# Patient Record
Sex: FEMALE | Race: WHITE | NOT HISPANIC OR LATINO | Employment: UNEMPLOYED | URBAN - NONMETROPOLITAN AREA
[De-identification: names, ages, dates, MRNs, and addresses within clinical notes are randomized per-mention and may not be internally consistent; named-entity substitution may affect disease eponyms.]

---

## 2017-10-31 ENCOUNTER — OFFICE VISIT (OUTPATIENT)
Dept: FAMILY MEDICINE CLINIC | Facility: CLINIC | Age: 32
End: 2017-10-31

## 2017-10-31 VITALS
OXYGEN SATURATION: 99 % | HEIGHT: 67 IN | SYSTOLIC BLOOD PRESSURE: 115 MMHG | TEMPERATURE: 98.1 F | DIASTOLIC BLOOD PRESSURE: 75 MMHG | WEIGHT: 173 LBS | BODY MASS INDEX: 27.15 KG/M2 | HEART RATE: 84 BPM

## 2017-10-31 DIAGNOSIS — D50.8 IRON DEFICIENCY ANEMIA SECONDARY TO INADEQUATE DIETARY IRON INTAKE: ICD-10-CM

## 2017-10-31 DIAGNOSIS — Z00.00 ENCOUNTER FOR MEDICAL EXAMINATION TO ESTABLISH CARE: Primary | ICD-10-CM

## 2017-10-31 DIAGNOSIS — R53.83 OTHER FATIGUE: ICD-10-CM

## 2017-10-31 DIAGNOSIS — Z00.00 ROUTINE HEALTH MAINTENANCE: ICD-10-CM

## 2017-10-31 DIAGNOSIS — Z13.29 THYROID DISORDER SCREEN: ICD-10-CM

## 2017-10-31 LAB
25(OH)D3 SERPL-MCNC: 27 NG/ML
ALBUMIN SERPL-MCNC: 4.5 G/DL (ref 3.5–5)
ALBUMIN/GLOB SERPL: 1.5 G/DL (ref 1.5–2.5)
ALP SERPL-CCNC: 57 U/L (ref 35–104)
ALT SERPL W P-5'-P-CCNC: 10 U/L (ref 10–36)
ANION GAP SERPL CALCULATED.3IONS-SCNC: 1.9 MMOL/L (ref 3.6–11.2)
AST SERPL-CCNC: 14 U/L (ref 10–30)
BASOPHILS # BLD AUTO: 0.03 10*3/MM3 (ref 0–0.3)
BASOPHILS NFR BLD AUTO: 0.5 % (ref 0–2)
BILIRUB SERPL-MCNC: 0.8 MG/DL (ref 0.2–1.8)
BUN BLD-MCNC: 12 MG/DL (ref 7–21)
BUN/CREAT SERPL: 13.6 (ref 7–25)
CALCIUM SPEC-SCNC: 9.4 MG/DL (ref 7.7–10)
CHLORIDE SERPL-SCNC: 110 MMOL/L (ref 99–112)
CHOLEST SERPL-MCNC: 141 MG/DL (ref 0–200)
CO2 SERPL-SCNC: 27.1 MMOL/L (ref 24.3–31.9)
CREAT BLD-MCNC: 0.88 MG/DL (ref 0.43–1.29)
DEPRECATED RDW RBC AUTO: 42.6 FL (ref 37–54)
EOSINOPHIL # BLD AUTO: 0.05 10*3/MM3 (ref 0–0.7)
EOSINOPHIL NFR BLD AUTO: 0.9 % (ref 0–5)
ERYTHROCYTE [DISTWIDTH] IN BLOOD BY AUTOMATED COUNT: 13 % (ref 11.5–14.5)
GFR SERPL CREATININE-BSD FRML MDRD: 74 ML/MIN/1.73
GLOBULIN UR ELPH-MCNC: 3.1 GM/DL
GLUCOSE BLD-MCNC: 92 MG/DL (ref 70–110)
HBA1C MFR BLD: 5 % (ref 4.5–5.7)
HCT VFR BLD AUTO: 37.5 % (ref 37–47)
HDLC SERPL-MCNC: 48 MG/DL (ref 60–100)
HGB BLD-MCNC: 12.5 G/DL (ref 12–16)
IMM GRANULOCYTES # BLD: 0 10*3/MM3 (ref 0–0.03)
IMM GRANULOCYTES NFR BLD: 0 % (ref 0–0.5)
IRON 24H UR-MRATE: 113 MCG/DL (ref 49–151)
IRON SATN MFR SERPL: 39 % (ref 15–50)
LDLC SERPL CALC-MCNC: 83 MG/DL (ref 0–100)
LDLC/HDLC SERPL: 1.74 {RATIO}
LYMPHOCYTES # BLD AUTO: 1.98 10*3/MM3 (ref 1–3)
LYMPHOCYTES NFR BLD AUTO: 35.2 % (ref 21–51)
MCH RBC QN AUTO: 30.3 PG (ref 27–33)
MCHC RBC AUTO-ENTMCNC: 33.3 G/DL (ref 33–37)
MCV RBC AUTO: 90.8 FL (ref 80–94)
MONOCYTES # BLD AUTO: 0.45 10*3/MM3 (ref 0.1–0.9)
MONOCYTES NFR BLD AUTO: 8 % (ref 0–10)
NEUTROPHILS # BLD AUTO: 3.12 10*3/MM3 (ref 1.4–6.5)
NEUTROPHILS NFR BLD AUTO: 55.4 % (ref 30–70)
OSMOLALITY SERPL CALC.SUM OF ELEC: 276.9 MOSM/KG (ref 273–305)
PLATELET # BLD AUTO: 218 10*3/MM3 (ref 130–400)
PMV BLD AUTO: 12.1 FL (ref 6–10)
POTASSIUM BLD-SCNC: 3.7 MMOL/L (ref 3.5–5.3)
PROT SERPL-MCNC: 7.6 G/DL (ref 6–8)
RBC # BLD AUTO: 4.13 10*6/MM3 (ref 4.2–5.4)
SODIUM BLD-SCNC: 139 MMOL/L (ref 135–153)
T4 FREE SERPL-MCNC: 1.27 NG/DL (ref 0.89–1.76)
TIBC SERPL-MCNC: 288 MCG/DL (ref 241–421)
TRIGL SERPL-MCNC: 48 MG/DL (ref 0–150)
TSH SERPL DL<=0.05 MIU/L-ACNC: 4.04 MIU/ML (ref 0.55–4.78)
VIT B12 BLD-MCNC: 401 PG/ML (ref 211–911)
VLDLC SERPL-MCNC: 9.6 MG/DL
WBC NRBC COR # BLD: 5.63 10*3/MM3 (ref 4.5–12.5)

## 2017-10-31 PROCEDURE — 83540 ASSAY OF IRON: CPT | Performed by: NURSE PRACTITIONER

## 2017-10-31 PROCEDURE — 80050 GENERAL HEALTH PANEL: CPT | Performed by: NURSE PRACTITIONER

## 2017-10-31 PROCEDURE — 84439 ASSAY OF FREE THYROXINE: CPT | Performed by: NURSE PRACTITIONER

## 2017-10-31 PROCEDURE — 99203 OFFICE O/P NEW LOW 30 MIN: CPT | Performed by: NURSE PRACTITIONER

## 2017-10-31 PROCEDURE — 82306 VITAMIN D 25 HYDROXY: CPT | Performed by: NURSE PRACTITIONER

## 2017-10-31 PROCEDURE — 80061 LIPID PANEL: CPT | Performed by: NURSE PRACTITIONER

## 2017-10-31 PROCEDURE — 82607 VITAMIN B-12: CPT | Performed by: NURSE PRACTITIONER

## 2017-10-31 PROCEDURE — 83036 HEMOGLOBIN GLYCOSYLATED A1C: CPT | Performed by: NURSE PRACTITIONER

## 2017-10-31 PROCEDURE — 83550 IRON BINDING TEST: CPT | Performed by: NURSE PRACTITIONER

## 2017-10-31 PROCEDURE — 36415 COLL VENOUS BLD VENIPUNCTURE: CPT | Performed by: NURSE PRACTITIONER

## 2017-10-31 NOTE — PROGRESS NOTES
"Subjective   Lselie Mead is a 32 y.o. female.     Chief Complaint   Patient presents with   • Establish Care       History of Present Illness     Establish Care-Here today to establish care.  It has been approx a year since she has had any medical care.  Her last provider was DESIRE Rojas at [facility].  She reports she has been treated for chest ligament strains in the past by 2 different providers.  No other acute problems.  She does report a history of low iron.   No recent labs    The following portions of the patient's history were reviewed and updated as appropriate: allergies, current medications, past family history, past medical history, past social history, past surgical history and problem list.    Review of Systems   Constitutional: Negative for appetite change, fatigue and unexpected weight change.   HENT: Negative for congestion, ear pain, nosebleeds, postnasal drip, rhinorrhea, sore throat, trouble swallowing and voice change.    Eyes: Negative for photophobia, pain and visual disturbance.        Last eye exam approx 1 year ago.   Does wear corrective lens   Respiratory: Negative for cough, chest tightness, shortness of breath and wheezing.         Reports snoring   Cardiovascular: Negative for chest pain, palpitations and leg swelling.        Mid sternal chest wall.  Not of new onset   Gastrointestinal: Negative for abdominal pain, blood in stool, constipation, diarrhea, nausea and vomiting.   Endocrine: Negative for cold intolerance and polydipsia.   Genitourinary: Negative for difficulty urinating, flank pain, hematuria and vaginal discharge.        \"knot\" in her low pelvis on the left side   Musculoskeletal: Positive for back pain (low back, no injury.  History of 2 epidurals.  Mostly present when \"weather changes\") and neck pain (occasional). Negative for gait problem, joint swelling, myalgias and neck stiffness.   Skin: Negative for color change and rash.   Allergic/Immunologic: " "Negative.    Neurological: Positive for dizziness (with sudden position changes). Negative for seizures, syncope, numbness and headaches.   Hematological: Negative.  Does not bruise/bleed easily.   Psychiatric/Behavioral: Negative for dysphoric mood, sleep disturbance and suicidal ideas. The patient is not nervous/anxious.    All other systems reviewed and are negative.      Objective     /75 (BP Location: Left arm, Patient Position: Sitting)  Pulse 84  Temp 98.1 °F (36.7 °C) (Tympanic)   Ht 67\" (170.2 cm)  Wt 173 lb (78.5 kg)  LMP 10/08/2017  SpO2 99%  BMI 27.1 kg/m2    Physical Exam   Constitutional: She is oriented to person, place, and time. She appears well-developed and well-nourished. No distress.   HENT:   Head: Normocephalic and atraumatic.   Right Ear: External ear normal.   Left Ear: External ear normal.   Nose: Nose normal.   Mouth/Throat: Oropharynx is clear and moist.   Eyes: Conjunctivae, EOM and lids are normal. Pupils are equal, round, and reactive to light. No scleral icterus. Right eye exhibits normal extraocular motion and no nystagmus. Left eye exhibits normal extraocular motion and no nystagmus.   Neck: Normal range of motion. Neck supple. No JVD present. No tracheal tenderness present. No thyromegaly present.   Cardiovascular: Normal rate, regular rhythm, normal heart sounds and intact distal pulses.    No murmur heard.  Pulmonary/Chest: Effort normal and breath sounds normal. She exhibits no mass, no tenderness and no crepitus.   Abdominal: Soft. Bowel sounds are normal. She exhibits no mass. There is no hepatosplenomegaly. There is no tenderness.   Musculoskeletal: Normal range of motion. She exhibits no edema or tenderness.   Gait normal.   equal bilaterally. No muscular atrophy or flaccidity.   Lymphadenopathy:   No palpable nodes   Neurological: She is alert and oriented to person, place, and time. She has normal strength and normal reflexes. She displays no tremor. No " cranial nerve deficit or sensory deficit. She exhibits normal muscle tone. Coordination and gait normal.   Skin: Skin is warm and dry. No rash noted. No erythema.   Psychiatric: She has a normal mood and affect. Her behavior is normal. Judgment and thought content normal.   Vitals reviewed.    Assessment/Plan     Problem List Items Addressed This Visit     None      Visit Diagnoses     Encounter for medical examination to establish care    -  Primary    Relevant Orders    CBC & Differential (Completed)    Comprehensive Metabolic Panel (Completed)    Lipid Panel (Completed)    TSH (Completed)    Hemoglobin A1c (Completed)    Vitamin D 25 Hydroxy (Completed)    Vitamin B12 (Completed)    T4, Free (Completed)    Iron Profile (Completed)    CBC Auto Differential (Completed)    Osmolality, Calculated (Completed)    Iron deficiency anemia secondary to inadequate dietary iron intake        Relevant Orders    CBC & Differential (Completed)    Comprehensive Metabolic Panel (Completed)    Lipid Panel (Completed)    Hemoglobin A1c (Completed)    Vitamin D 25 Hydroxy (Completed)    Vitamin B12 (Completed)    Iron Profile (Completed)    CBC Auto Differential (Completed)    Osmolality, Calculated (Completed)    Routine health maintenance        Relevant Orders    CBC & Differential (Completed)    Comprehensive Metabolic Panel (Completed)    Lipid Panel (Completed)    TSH (Completed)    Hemoglobin A1c (Completed)    Vitamin D 25 Hydroxy (Completed)    Vitamin B12 (Completed)    T4, Free (Completed)    Iron Profile (Completed)    CBC Auto Differential (Completed)    Osmolality, Calculated (Completed)    Thyroid disorder screen        Relevant Orders    TSH (Completed)    T4, Free (Completed)    Other fatigue        Relevant Orders    TSH (Completed)    Vitamin B12 (Completed)    T4, Free (Completed)        Fasting labs ordered.  Will call patient with results and make any meds changes PRN  Understands disease processes and need for  medications.  Understands reasons for urgent and emergent care.  Patient (& family) verbalized agreement for treatment plan.   RTC 6 months, sooner if needed.         This document has been electronically signed by:  DESIRE Jordan, LANDRY

## 2017-12-13 ENCOUNTER — OFFICE VISIT (OUTPATIENT)
Dept: FAMILY MEDICINE CLINIC | Facility: CLINIC | Age: 32
End: 2017-12-13

## 2017-12-13 VITALS
TEMPERATURE: 97.7 F | DIASTOLIC BLOOD PRESSURE: 60 MMHG | OXYGEN SATURATION: 99 % | BODY MASS INDEX: 27.47 KG/M2 | HEIGHT: 67 IN | WEIGHT: 175 LBS | SYSTOLIC BLOOD PRESSURE: 100 MMHG | HEART RATE: 96 BPM

## 2017-12-13 DIAGNOSIS — D17.1 LIPOMA OF TORSO: Primary | ICD-10-CM

## 2017-12-13 PROCEDURE — 99213 OFFICE O/P EST LOW 20 MIN: CPT | Performed by: NURSE PRACTITIONER

## 2017-12-13 NOTE — PROGRESS NOTES
"Subjective   Leslie Mead is a 32 y.o. female.     Chief Complaint   Patient presents with   • knot on pelvis       History of Present Illness     Pelvis complaint-intermittent pelvic pain.  She reports left sided pain. And superficial.  She reports it has been present for a while.  No recent eval.  She has not had any ovarian cyst in the past.  Menses have been regular.  She reports a history of PCOS.  Pain is not present with intercourse.  No vaginal discharge.  History of C section x's 2 and BTL.  Not at goal.     The following portions of the patient's history were reviewed and updated as appropriate: allergies, current medications, past family history, past medical history, past social history, past surgical history and problem list.    Review of Systems   Constitutional: Negative for appetite change, fatigue and unexpected weight change.   HENT: Negative for congestion, ear pain, nosebleeds, postnasal drip, rhinorrhea, sore throat, trouble swallowing and voice change.    Eyes: Negative for photophobia, pain and visual disturbance.        Last eye exam approx 1 year ago.   Does wear corrective lens   Respiratory: Positive for cough (for approx 3 weeks). Negative for chest tightness, shortness of breath and wheezing.         Reports snoring   Cardiovascular: Negative for chest pain, palpitations and leg swelling.        Mid sternal chest wall.  Not of new onset   Gastrointestinal: Negative for abdominal pain, blood in stool, constipation, diarrhea, nausea and vomiting.   Endocrine: Negative for cold intolerance and polydipsia.   Genitourinary: Positive for pelvic pain. Negative for difficulty urinating, flank pain, hematuria and vaginal discharge.        \"knot\" in her low pelvis on the left side   Musculoskeletal: Positive for back pain (low back, no injury.  History of 2 epidurals.  Mostly present when \"weather changes\") and neck pain (occasional). Negative for gait problem, joint swelling, myalgias and " "neck stiffness.   Skin: Negative for color change and rash.   Allergic/Immunologic: Negative.    Neurological: Positive for dizziness (with sudden position changes). Negative for seizures, syncope, numbness and headaches.   Hematological: Negative.  Does not bruise/bleed easily.   Psychiatric/Behavioral: Negative for dysphoric mood, sleep disturbance and suicidal ideas. The patient is not nervous/anxious.    All other systems reviewed and are negative.      Objective     /60 (BP Location: Right arm, Patient Position: Sitting, Cuff Size: Adult)  Pulse 96  Temp 97.7 °F (36.5 °C) (Tympanic)   Ht 170.2 cm (67.01\")  Wt 79.4 kg (175 lb)  LMP 12/12/2017  SpO2 99%  BMI 27.4 kg/m2    Physical Exam   Constitutional: She is oriented to person, place, and time. She appears well-developed and well-nourished. No distress.   HENT:   Head: Normocephalic and atraumatic.   Right Ear: External ear normal.   Left Ear: External ear normal.   Nose: Nose normal.   Mouth/Throat: Oropharynx is clear and moist.   Eyes: Conjunctivae, EOM and lids are normal. Pupils are equal, round, and reactive to light. No scleral icterus. Right eye exhibits normal extraocular motion and no nystagmus. Left eye exhibits normal extraocular motion and no nystagmus.   Neck: Normal range of motion. Neck supple. No JVD present. No tracheal tenderness present. No thyromegaly present.   Cardiovascular: Normal rate, regular rhythm, normal heart sounds and intact distal pulses.    No murmur heard.  Pulmonary/Chest: Effort normal and breath sounds normal. She exhibits no mass, no tenderness and no crepitus.   Abdominal: Soft. Bowel sounds are normal. She exhibits no mass. There is no hepatosplenomegaly. There is no tenderness.       Musculoskeletal: Normal range of motion. She exhibits no edema or tenderness.   Gait normal.   equal bilaterally. No muscular atrophy or flaccidity.   Lymphadenopathy:   No palpable nodes   Neurological: She is alert and " oriented to person, place, and time. She has normal strength and normal reflexes. She displays no tremor. No cranial nerve deficit or sensory deficit. She exhibits normal muscle tone. Coordination and gait normal.   Skin: Skin is warm and dry. No rash noted. No erythema.   Psychiatric: She has a normal mood and affect. Her behavior is normal. Judgment and thought content normal.   Vitals reviewed.    Assessment/Plan     Problem List Items Addressed This Visit     None      Visit Diagnoses     Lipoma of torso    -  Primary    Relevant Orders    US Pelvis Complete      imaging ordered to eval area.  Will refer if warranted.  Discussed possibilities including lipoma or inclusion cyst.  Understands disease processes and need for medications.  Understands reasons for urgent and emergent care.  Patient (& family) verbalized agreement for treatment plan.   RTC PRN 3-5 days for worsening or non resolving symptoms        This document has been electronically signed by:  DESIRE Jordan, BERNIEP-C

## 2017-12-26 DIAGNOSIS — R93.89 ABNORMAL ULTRASOUND: Primary | ICD-10-CM

## 2017-12-28 DIAGNOSIS — D17.1 LIPOMA OF TORSO: Primary | ICD-10-CM

## 2018-01-15 DIAGNOSIS — R19.04 ABDOMINAL WALL MASS OF LEFT LOWER QUADRANT: Primary | ICD-10-CM

## 2018-01-31 RX ORDER — IBUPROFEN 800 MG/1
800 TABLET ORAL 3 TIMES DAILY PRN
Qty: 90 TABLET | Refills: 5 | Status: SHIPPED | OUTPATIENT
Start: 2018-01-31